# Patient Record
Sex: FEMALE | Race: OTHER | Employment: FULL TIME | URBAN - METROPOLITAN AREA
[De-identification: names, ages, dates, MRNs, and addresses within clinical notes are randomized per-mention and may not be internally consistent; named-entity substitution may affect disease eponyms.]

---

## 2021-10-05 LAB
ANTIBODY SCREEN OB: NEGATIVE
HEPATITIS B SURFACE ANTIGEN OB: NEGATIVE
HIV RESULT OB: NEGATIVE
RAPID PLASMA REAGIN OB: NONREACTIVE
RH FACTOR OB: POSITIVE

## 2021-11-30 DIAGNOSIS — Z34.02 ENCOUNTER FOR SUPERVISION OF NORMAL FIRST PREGNANCY IN SECOND TRIMESTER: Primary | ICD-10-CM

## 2021-11-30 DIAGNOSIS — Z36.89 ENCOUNTER FOR FETAL ANATOMIC SURVEY: ICD-10-CM

## 2021-12-07 DIAGNOSIS — Z34.02 ENCOUNTER FOR SUPERVISION OF NORMAL FIRST PREGNANCY IN SECOND TRIMESTER: Primary | ICD-10-CM

## 2021-12-07 DIAGNOSIS — Z36.89 ENCOUNTER FOR FETAL ANATOMIC SURVEY: ICD-10-CM

## 2022-01-03 ENCOUNTER — HOSPITAL ENCOUNTER (OUTPATIENT)
Dept: ULTRASOUND IMAGING | Facility: HOSPITAL | Age: 27
Discharge: HOME | End: 2022-01-03
Attending: OBSTETRICS & GYNECOLOGY
Payer: COMMERCIAL

## 2022-01-03 DIAGNOSIS — Z34.02 ENCOUNTER FOR SUPERVISION OF NORMAL FIRST PREGNANCY IN SECOND TRIMESTER: ICD-10-CM

## 2022-01-03 DIAGNOSIS — Z36.89 ENCOUNTER FOR FETAL ANATOMIC SURVEY: ICD-10-CM

## 2022-01-03 PROCEDURE — 76805 OB US >/= 14 WKS SNGL FETUS: CPT | Performed by: OBSTETRICS & GYNECOLOGY

## 2022-03-21 LAB — HIV RESULT OB: NEGATIVE

## 2022-04-28 LAB — STREP GP B CULT OB: NEGATIVE

## 2022-05-01 LAB — AMB EXT COVID-19 RESULT: NOT DETECTED

## 2022-05-02 ENCOUNTER — ANESTHESIA (OUTPATIENT)
Dept: OBGYN UNIT | Facility: HOSPITAL | Age: 27
End: 2022-05-02
Payer: COMMERCIAL

## 2022-05-02 ENCOUNTER — ANESTHESIA EVENT (OUTPATIENT)
Dept: OBGYN UNIT | Facility: HOSPITAL | Age: 27
End: 2022-05-02
Payer: COMMERCIAL

## 2022-05-02 ENCOUNTER — HOSPITAL ENCOUNTER (INPATIENT)
Facility: HOSPITAL | Age: 27
LOS: 2 days | Discharge: HOME | End: 2022-05-04
Attending: OBSTETRICS & GYNECOLOGY | Admitting: OBSTETRICS & GYNECOLOGY
Payer: COMMERCIAL

## 2022-05-02 PROBLEM — Z34.90 PREGNANCY (HCC): Status: ACTIVE | Noted: 2022-05-02

## 2022-05-02 PROBLEM — Z34.90 PREGNANCY: Status: ACTIVE | Noted: 2022-05-02

## 2022-05-02 LAB
ALBUMIN SERPL-MCNC: 2.5 G/DL (ref 3.4–5)
ALBUMIN/GLOB SERPL: 0.5 {RATIO} (ref 1–2)
ALP LIVER SERPL-CCNC: 163 U/L
ALT SERPL-CCNC: 47 U/L
ANION GAP SERPL CALC-SCNC: 6 MMOL/L (ref 0–18)
ANTIBODY SCREEN: NEGATIVE
AST SERPL-CCNC: 45 U/L (ref 15–37)
BASOPHILS # BLD AUTO: 0.03 X10(3) UL (ref 0–0.2)
BASOPHILS NFR BLD AUTO: 0.3 %
BILIRUB SERPL-MCNC: 0.5 MG/DL (ref 0.1–2)
BUN BLD-MCNC: 6 MG/DL (ref 7–18)
CALCIUM BLD-MCNC: 9.1 MG/DL (ref 8.5–10.1)
CHLORIDE SERPL-SCNC: 109 MMOL/L (ref 98–112)
CO2 SERPL-SCNC: 22 MMOL/L (ref 21–32)
CREAT BLD-MCNC: 0.6 MG/DL
EOSINOPHIL # BLD AUTO: 0.04 X10(3) UL (ref 0–0.7)
EOSINOPHIL NFR BLD AUTO: 0.4 %
ERYTHROCYTE [DISTWIDTH] IN BLOOD BY AUTOMATED COUNT: 21.6 %
FASTING STATUS PATIENT QL REPORTED: YES
GLOBULIN PLAS-MCNC: 4.7 G/DL (ref 2.8–4.4)
GLUCOSE BLD-MCNC: 89 MG/DL (ref 70–99)
HCT VFR BLD AUTO: 38.5 %
HGB BLD-MCNC: 11.9 G/DL
IMM GRANULOCYTES # BLD AUTO: 0.08 X10(3) UL (ref 0–1)
IMM GRANULOCYTES NFR BLD: 0.8 %
LYMPHOCYTES # BLD AUTO: 2.01 X10(3) UL (ref 1–4)
LYMPHOCYTES NFR BLD AUTO: 21 %
MCH RBC QN AUTO: 24.8 PG (ref 26–34)
MCHC RBC AUTO-ENTMCNC: 30.9 G/DL (ref 31–37)
MCV RBC AUTO: 80.4 FL
MONOCYTES # BLD AUTO: 0.63 X10(3) UL (ref 0.1–1)
MONOCYTES NFR BLD AUTO: 6.6 %
NEUTROPHILS # BLD AUTO: 6.79 X10 (3) UL (ref 1.5–7.7)
NEUTROPHILS # BLD AUTO: 6.79 X10(3) UL (ref 1.5–7.7)
NEUTROPHILS NFR BLD AUTO: 70.9 %
OSMOLALITY SERPL CALC.SUM OF ELEC: 281 MOSM/KG (ref 275–295)
PLATELET # BLD AUTO: 285 10(3)UL (ref 150–450)
POTASSIUM SERPL-SCNC: 4.1 MMOL/L (ref 3.5–5.1)
PROT SERPL-MCNC: 7.2 G/DL (ref 6.4–8.2)
RBC # BLD AUTO: 4.79 X10(6)UL
RH BLOOD TYPE: POSITIVE
SODIUM SERPL-SCNC: 137 MMOL/L (ref 136–145)
T PALLIDUM AB SER QL IA: NONREACTIVE
URATE SERPL-MCNC: 3.9 MG/DL
WBC # BLD AUTO: 9.6 X10(3) UL (ref 4–11)

## 2022-05-02 PROCEDURE — 99214 OFFICE O/P EST MOD 30 MIN: CPT

## 2022-05-02 PROCEDURE — 0UQMXZZ REPAIR VULVA, EXTERNAL APPROACH: ICD-10-PCS | Performed by: OBSTETRICS & GYNECOLOGY

## 2022-05-02 PROCEDURE — 85025 COMPLETE CBC W/AUTO DIFF WBC: CPT | Performed by: OBSTETRICS & GYNECOLOGY

## 2022-05-02 PROCEDURE — 86900 BLOOD TYPING SEROLOGIC ABO: CPT | Performed by: OBSTETRICS & GYNECOLOGY

## 2022-05-02 PROCEDURE — 86850 RBC ANTIBODY SCREEN: CPT | Performed by: OBSTETRICS & GYNECOLOGY

## 2022-05-02 PROCEDURE — 84550 ASSAY OF BLOOD/URIC ACID: CPT | Performed by: OBSTETRICS & GYNECOLOGY

## 2022-05-02 PROCEDURE — 86780 TREPONEMA PALLIDUM: CPT | Performed by: OBSTETRICS & GYNECOLOGY

## 2022-05-02 PROCEDURE — 80053 COMPREHEN METABOLIC PANEL: CPT | Performed by: OBSTETRICS & GYNECOLOGY

## 2022-05-02 PROCEDURE — 86901 BLOOD TYPING SEROLOGIC RH(D): CPT | Performed by: OBSTETRICS & GYNECOLOGY

## 2022-05-02 RX ORDER — BUPIVACAINE HCL/0.9 % NACL/PF 0.25 %
5 PLASTIC BAG, INJECTION (ML) EPIDURAL AS NEEDED
Status: DISCONTINUED | OUTPATIENT
Start: 2022-05-02 | End: 2022-05-02

## 2022-05-02 RX ORDER — IBUPROFEN 600 MG/1
600 TABLET ORAL EVERY 6 HOURS PRN
Status: DISCONTINUED | OUTPATIENT
Start: 2022-05-02 | End: 2022-05-02 | Stop reason: HOSPADM

## 2022-05-02 RX ORDER — METHYLERGONOVINE MALEATE 0.2 MG/ML
INJECTION INTRAVENOUS
Status: COMPLETED
Start: 2022-05-02 | End: 2022-05-02

## 2022-05-02 RX ORDER — SIMETHICONE 80 MG
80 TABLET,CHEWABLE ORAL 3 TIMES DAILY PRN
Status: DISCONTINUED | OUTPATIENT
Start: 2022-05-02 | End: 2022-05-04

## 2022-05-02 RX ORDER — TRISODIUM CITRATE DIHYDRATE AND CITRIC ACID MONOHYDRATE 500; 334 MG/5ML; MG/5ML
30 SOLUTION ORAL AS NEEDED
Status: DISCONTINUED | OUTPATIENT
Start: 2022-05-02 | End: 2022-05-02 | Stop reason: HOSPADM

## 2022-05-02 RX ORDER — IBUPROFEN 600 MG/1
600 TABLET ORAL EVERY 6 HOURS
Status: DISCONTINUED | OUTPATIENT
Start: 2022-05-02 | End: 2022-05-04

## 2022-05-02 RX ORDER — BISACODYL 10 MG
10 SUPPOSITORY, RECTAL RECTAL ONCE AS NEEDED
Status: DISCONTINUED | OUTPATIENT
Start: 2022-05-02 | End: 2022-05-04

## 2022-05-02 RX ORDER — METHYLERGONOVINE MALEATE 0.2 MG/ML
0.2 INJECTION INTRAVENOUS ONCE
Status: COMPLETED | OUTPATIENT
Start: 2022-05-02 | End: 2022-05-02

## 2022-05-02 RX ORDER — ACETAMINOPHEN 500 MG
1000 TABLET ORAL EVERY 6 HOURS PRN
Status: DISCONTINUED | OUTPATIENT
Start: 2022-05-02 | End: 2022-05-04

## 2022-05-02 RX ORDER — ONDANSETRON 2 MG/ML
4 INJECTION INTRAMUSCULAR; INTRAVENOUS EVERY 6 HOURS PRN
Status: DISCONTINUED | OUTPATIENT
Start: 2022-05-02 | End: 2022-05-02 | Stop reason: HOSPADM

## 2022-05-02 RX ORDER — DOCUSATE SODIUM 100 MG/1
100 CAPSULE, LIQUID FILLED ORAL
Status: DISCONTINUED | OUTPATIENT
Start: 2022-05-02 | End: 2022-05-04

## 2022-05-02 RX ORDER — ACETAMINOPHEN 500 MG
500 TABLET ORAL EVERY 6 HOURS PRN
Status: DISCONTINUED | OUTPATIENT
Start: 2022-05-02 | End: 2022-05-04

## 2022-05-02 RX ORDER — AMMONIA INHALANTS 0.04 G/.3ML
0.3 INHALANT RESPIRATORY (INHALATION) AS NEEDED
Status: DISCONTINUED | OUTPATIENT
Start: 2022-05-02 | End: 2022-05-02 | Stop reason: HOSPADM

## 2022-05-02 RX ORDER — GLYCERIN/MINERAL OIL
LOTION (ML) TOPICAL
COMMUNITY

## 2022-05-02 RX ORDER — ACETAMINOPHEN 500 MG
500 TABLET ORAL EVERY 6 HOURS PRN
Status: DISCONTINUED | OUTPATIENT
Start: 2022-05-02 | End: 2022-05-02

## 2022-05-02 RX ORDER — MELATONIN
325
COMMUNITY

## 2022-05-02 RX ORDER — DEXTROSE, SODIUM CHLORIDE, SODIUM LACTATE, POTASSIUM CHLORIDE, AND CALCIUM CHLORIDE 5; .6; .31; .03; .02 G/100ML; G/100ML; G/100ML; G/100ML; G/100ML
INJECTION, SOLUTION INTRAVENOUS AS NEEDED
Status: DISCONTINUED | OUTPATIENT
Start: 2022-05-02 | End: 2022-05-02 | Stop reason: HOSPADM

## 2022-05-02 RX ORDER — SODIUM CHLORIDE, SODIUM LACTATE, POTASSIUM CHLORIDE, CALCIUM CHLORIDE 600; 310; 30; 20 MG/100ML; MG/100ML; MG/100ML; MG/100ML
INJECTION, SOLUTION INTRAVENOUS CONTINUOUS
Status: DISCONTINUED | OUTPATIENT
Start: 2022-05-02 | End: 2022-05-02 | Stop reason: HOSPADM

## 2022-05-02 RX ORDER — TERBUTALINE SULFATE 1 MG/ML
0.25 INJECTION, SOLUTION SUBCUTANEOUS AS NEEDED
Status: DISCONTINUED | OUTPATIENT
Start: 2022-05-02 | End: 2022-05-02 | Stop reason: HOSPADM

## 2022-05-02 RX ORDER — NALBUPHINE HCL 10 MG/ML
2.5 AMPUL (ML) INJECTION
Status: DISCONTINUED | OUTPATIENT
Start: 2022-05-02 | End: 2022-05-02

## 2022-05-02 NOTE — PROGRESS NOTES
NURSING ADMISSION NOTE      Patient admitted via wheelchair  Oriented to room. Safety precautions initiated. Bed in low position. Call light in reach. Instructed to call for assistance before ambulating for the 1st time. RN reviewed admit packet with patient and  Plan of care reviewed.

## 2022-05-02 NOTE — PROGRESS NOTES
Pt ambulatory to triage to r/o srom. Pt is  stating leakage of clear fluid at 0800 today and mild contractions every3-5 minutes apart.  Triage process explained and pt verbalized understanding

## 2022-05-02 NOTE — PLAN OF CARE
Problem: BIRTH - VAGINAL/ SECTION  Goal: Fetal and maternal status remain reassuring during the birth process  Description: INTERVENTIONS:  - Monitor vital signs  - Monitor fetal heart rate  - Monitor uterine activity  - Monitor labor progression (vaginal delivery)  - DVT prophylaxis (C/S delivery)  - Surgical antibiotic prophylaxis (C/S delivery)  Outcome: Progressing     Problem: PAIN - ADULT  Goal: Verbalizes/displays adequate comfort level or patient's stated pain goal  Description: INTERVENTIONS:  - Encourage pt to monitor pain and request assistance  - Assess pain using appropriate pain scale  - Administer analgesics based on type and severity of pain and evaluate response  - Implement non-pharmacological measures as appropriate and evaluate response  - Consider cultural and social influences on pain and pain management  - Manage/alleviate anxiety  - Utilize distraction and/or relaxation techniques  - Monitor for opioid side effects  - Notify MD/LIP if interventions unsuccessful or patient reports new pain  - Anticipate increased pain with activity and pre-medicate as appropriate  Outcome: Progressing     Problem: ANXIETY  Goal: Will report anxiety at manageable levels  Description: INTERVENTIONS:  - Administer medication as ordered  - Teach and rehearse alternative coping skills  - Provide emotional support with 1:1 interaction with staff  Outcome: Progressing     Problem: Patient/Family Goals  Goal: Patient/Family Long Term Goal  Description: Patient's Long Term Goal: Have a safe delivery    Interventions:  - Follow policies and procedures.  Keep provider informed of pt status  - See additional Care Plan goals for specific interventions  Outcome: Progressing  Goal: Patient/Family Short Term Goal  Description: Patient's Short Term Goal: Adequate pain management    Interventions:   - Offer pain meds and epidural  - See additional Care Plan goals for specific interventions  Outcome: Progressing Problem: SAFETY ADULT - FALL  Goal: Free from fall injury  Description: INTERVENTIONS:  - Assess pt frequently for physical needs  - Identify cognitive and physical deficits and behaviors that affect risk of falls.   - [place] fall precautions as indicated by assessment.  - Educate pt/family on patient safety including physical limitations  - Instruct pt to call for assistance with activity based on assessment  - Modify environment to reduce risk of injury  - Provide assistive devices as appropriate  - Consider OT/PT consult to assist with strengthening/mobility  - Encourage toileting schedule  Outcome: Progressing

## 2022-05-02 NOTE — ANESTHESIA PROCEDURE NOTES
Labor Analgesia  Performed by: Zaire Galeano MD  Authorized by: Zaire Galeano MD       General Information and Staff    Start Time:  5/2/2022 10:38 AM  End Time:  5/2/2022 10:40 AM  Anesthesiologist:  Zaire Galeano MD  Performed by:   Anesthesiologist  Patient Location:  OB  Site Identification: surface landmarks    Reason for Block: labor epidural    Preanesthetic Checklist: patient identified, IV checked, risks and benefits discussed, monitors and equipment checked, pre-op evaluation, timeout performed, IV bolus, anesthesia consent and sterile technique used      Procedure Details    Patient Position:  Sitting  Prep: ChloraPrep    Monitoring:  Heart rate and continuous pulse ox  Approach:  Midline    Epidural Needle    Injection Technique:  VIGNESH air  Needle Type:  Tuohy  Needle Gauge:  17 G  Needle Length:  3.375 in  Needle Insertion Depth:  5    Spinal Needle      Catheter    Catheter Type:  End hole  Catheter Size:  19 G  Catheter at Skin Depth:  10  Test Dose:  Negative    Assessment  Sensory Level:  T8    Additional Comments     Initial 3mL test dose + 100ug fentanyl + remaining 2mL test dose administered via epidural catheter

## 2022-05-02 NOTE — PLAN OF CARE
Problem: SAFETY ADULT - FALL  Goal: Free from fall injury  Description: INTERVENTIONS:  - Assess pt frequently for physical needs  - Identify cognitive and physical deficits and behaviors that affect risk of falls. - [place] fall precautions as indicated by assessment.  - Educate pt/family on patient safety including physical limitations  - Instruct pt to call for assistance with activity based on assessment  - Modify environment to reduce risk of injury  - Provide assistive devices as appropriate  - Consider OT/PT consult to assist with strengthening/mobility  - Encourage toileting schedule  Outcome: Progressing     Problem: POSTPARTUM  Goal: Long Term Goal:Experiences normal postpartum course  Description: INTERVENTIONS:  - Assess and monitor vital signs and lab values. - Assess fundus and lochia. - Provide ice/sitz baths for perineum discomfort. - Monitor healing of incision/episiotomy/laceration, and assess for signs and symptoms of infection and hematoma. - Assess bladder function and monitor for bladder distention.  - Provide/instruct/assist with pericare as needed. - Provide VTE prophylaxis as needed. - Monitor bowel function.  - Encourage ambulation and provide assistance as needed. - Assess and monitor emotional status and provide social service/psych resources as needed. - Utilize standard precautions and use personal protective equipment as indicated. Ensure aseptic care of all intravenous lines and invasive tubes/drains.  - Obtain immunization and exposure to communicable diseases history. Outcome: Progressing  Goal: Optimize infant feeding at the breast  Description: INTERVENTIONS:  - Initiate breast feeding within first hour after birth. - Monitor effectiveness of current breast feeding efforts. - Assess support systems available to mother/family.  - Identify cultural beliefs/practices regarding lactation, letdown techniques, maternal food preferences.   - Assess mother's knowledge and previous experience with breast feeding.  - Provide information as needed about early infant feeding cues (e.g., rooting, lip smacking, sucking fingers/hand) versus late cue of crying.  - Discuss/demonstrate breast feeding aids (e.g., infant sling, nursing footstool/pillows, and breast pumps). - Encourage mother/other family members to express feelings/concerns, and actively listen. - Educate father/SO about benefits of breast feeding and how to manage common lactation challenges. - Recommend avoidance of specific medications or substances incompatible with breast feeding.  - Assess and monitor for signs of nipple pain/trauma. - Instruct and provide assistance with proper latch. - Review techniques for milk expression (breast pumping) and storage of breast milk. Provide pumping equipment/supplies, instructions and assistance, as needed. - Encourage rooming-in and breast feeding on demand.  - Encourage skin-to-skin contact. - Provide LC support as needed. - Assess for and manage engorgement. - Provide breast feeding education handouts and information on community breast feeding support. Outcome: Progressing  Goal: Establishment of adequate milk supply with medication/procedure interruptions  Description: INTERVENTIONS:  - Review techniques for milk expression (breast pumping). - Provide pumping equipment/supplies, instructions, and assistance until it is safe to breastfeed infant. Outcome: Progressing  Goal: Experiences normal breast weaning course  Description: INTERVENTIONS:  - Assess for and manage engorgement. - Instruct on breast care. - Provide comfort measures. Outcome: Progressing  Goal: Appropriate maternal -  bonding  Description: INTERVENTIONS:  - Assess caregiver- interactions. - Assess caregiver's emotional status and coping mechanisms. - Encourage caregiver to participate in  daily care.   - Assess support systems available to mother/family.  - Provide social services/case management support as needed.   Outcome: Progressing

## 2022-05-02 NOTE — L&D DELIVERY NOTE
Alannah Bunch, Girl [[id number]]    Labor Events     labor?: No   steroids?: None  Antibiotics received during labor?: No  Antibiotics (enter # doses in comment): none  Rupture date/time: 2022 0800     Rupture type: SROM  Fluid color: Clear  Induction: None  Intrapartum & labor complications: None     Labor Event Times    Labor onset date/time: 2022 0800  Dilation complete date/time: 2022 0940  Start pushing date/time: 2022 1100      Presentation    Presentation: Vertex  Position: Right Occiput Anterior     Operative Delivery    Operative Vaginal Delivery: No            Shoulder Dystocia    Shoulder Dystocia: No     Anesthesia    Method: Epidural           Delivery    Head delivery date/time: 2022 11:21:28   Delivery date/time:  22 11:21:43   Delivery type: Normal spontaneous vaginal delivery    Details:     Delivery location: delivery room     Delivery Providers    Delivering Clinician: Kristina Johnson MD   Delivery personnel:  Provider Role   Phillip White RN Baby Nurse   Juan Berrios RN Delivery Nurse         Cord    No data filed     [place] Measurements    Weight: 3240 g 7 lb 2.3 oz Length: 49.5 cm         Placenta    No data filed     Apgars    No data filed     Skin to Skin    No data filed     Vaginal Count    Initial count RN: Lubna Beaulieu RN  Initial count Tech: Ban Maynard   [place]    Initial counts 11   0    Final counts        Final count MD: Kristina Johnson MD     Delivery (Maternal)    Episiotomy: None  Periurethral laceration: right                                                                    Vaginal Delivery Note          [facility] [facility] Patient Status:  Inpatient    1995 MRN [id number]   Location [address] [place] [address] Attending Kristina Johnson MD   Hosp Day # 0 PCP PHYSICIAN NONSTAFF       Delivery: Normal spontaneous vaginal delivery  Surgeon: Flora Tello  Anesthesia: Epidural  EBL: 200cc  Infant: female  Apgars: 9, 9  Weight: 7# 2oz  Procedure: The patients was placed in the dorsalithotomy position and prepped. She was encouraged to push. As the head was delivered the legs were lowered and the perineum was supported to decrease the risk of tearing. The infants nose and mouth were bulb suctioned. The shoulders and body were delivered atraumatically. The infant was dried and suctioned. The cord was doubly clamped and cut at [address]. The cord blood was sampled. IV pitocin and gentle uterine massage helped delivery of the placenta. A small periurethral laceration repaired with 4-0 vicryl. A rectal-vaginal exam was normal and bleeding was minimal.  The patient was then moved to the supine position in stable condition. Counts were correct.     Nuchal cord: no  Cord Blood Banking: no  Episiotomy: no  Complications:  None      Mojgan Fink MD  5/2/2022  11:34 AM

## 2022-05-02 NOTE — PROGRESS NOTES
Pt up to BR with assistance from this RN. Gait steady. Pt unable to void at this time. Elena-bottle given. Pt up to [facility] without incident.

## 2022-05-02 NOTE — PLAN OF CARE
Problem: BIRTH - VAGINAL/ SECTION  Goal: Fetal and maternal status remain reassuring during the birth process  Description: INTERVENTIONS:  - Monitor vital signs  - Monitor fetal heart rate  - Monitor uterine activity  - Monitor labor progression (vaginal delivery)  - DVT prophylaxis (C/S delivery)  - Surgical antibiotic prophylaxis (C/S delivery)  2022 by Hector Castle RN  Outcome: Completed  2022 by Hector Castle RN  Outcome: Progressing     Problem: PAIN - ADULT  Goal: Verbalizes/displays adequate comfort level or patient's stated pain goal  Description: INTERVENTIONS:  - Encourage pt to monitor pain and request assistance  - Assess pain using appropriate pain scale  - Administer analgesics based on type and severity of pain and evaluate response  - Implement non-pharmacological measures as appropriate and evaluate response  - Consider cultural and social influences on pain and pain management  - Manage/alleviate anxiety  - Utilize distraction and/or relaxation techniques  - Monitor for opioid side effects  - Notify MD/LIP if interventions unsuccessful or patient reports new pain  - Anticipate increased pain with activity and pre-medicate as appropriate  2022 by Hector Castle RN  Outcome: Completed  2022 by Hector Castle RN  Outcome: Progressing     Problem: ANXIETY  Goal: Will report anxiety at manageable levels  Description: INTERVENTIONS:  - Administer medication as ordered  - Teach and rehearse alternative coping skills  - Provide emotional support with 1:1 interaction with staff  2022 by Hector Castle RN  Outcome: Completed  2022 by Hector Castle RN  Outcome: Progressing     Problem: Patient/Family Goals  Goal: Patient/Family Long Term Goal  Description: Patient's Long Term Goal: Have a safe delivery    Interventions:  - Follow policies and procedures.  Keep provider informed of pt status  - See additional Care Plan goals for specific interventions  5/2/2022 1226 by Coby Lechuga RN  Outcome: Completed  5/2/2022 1226 by Coby Lechuga RN  Outcome: Progressing  Goal: Patient/Family Short Term Goal  Description: Patient's Short Term Goal: Adequate pain management    Interventions:   - Offer pain meds and epidural  - See additional Care Plan goals for specific interventions  5/2/2022 1226 by Coby Lechuga RN  Outcome: Completed  5/2/2022 1226 by Coby Lechuga RN  Outcome: Progressing     Problem: SAFETY ADULT - FALL  Goal: Free from fall injury  Description: INTERVENTIONS:  - Assess pt frequently for physical needs  - Identify cognitive and physical deficits and behaviors that affect risk of falls.   - [place] fall precautions as indicated by assessment.  - Educate pt/family on patient safety including physical limitations  - Instruct pt to call for assistance with activity based on assessment  - Modify environment to reduce risk of injury  - Provide assistive devices as appropriate  - Consider OT/PT consult to assist with strengthening/mobility  - Encourage toileting schedule  5/2/2022 1226 by Coby Lechuga RN  Outcome: Progressing  5/2/2022 1226 by Coby Lechuga RN  Outcome: Progressing

## 2022-05-03 LAB
BASOPHILS # BLD AUTO: 0.03 X10(3) UL (ref 0–0.2)
BASOPHILS NFR BLD AUTO: 0.2 %
EOSINOPHIL # BLD AUTO: 0.08 X10(3) UL (ref 0–0.7)
EOSINOPHIL NFR BLD AUTO: 0.6 %
ERYTHROCYTE [DISTWIDTH] IN BLOOD BY AUTOMATED COUNT: 21.2 %
HCT VFR BLD AUTO: 30.7 %
HGB BLD-MCNC: 9.4 G/DL
IMM GRANULOCYTES # BLD AUTO: 0.15 X10(3) UL (ref 0–1)
IMM GRANULOCYTES NFR BLD: 1.2 %
LYMPHOCYTES # BLD AUTO: 2.58 X10(3) UL (ref 1–4)
LYMPHOCYTES NFR BLD AUTO: 20.9 %
MCH RBC QN AUTO: 24.8 PG (ref 26–34)
MCHC RBC AUTO-ENTMCNC: 30.6 G/DL (ref 31–37)
MCV RBC AUTO: 81 FL
MONOCYTES # BLD AUTO: 0.78 X10(3) UL (ref 0.1–1)
MONOCYTES NFR BLD AUTO: 6.3 %
NEUTROPHILS # BLD AUTO: 8.7 X10 (3) UL (ref 1.5–7.7)
NEUTROPHILS # BLD AUTO: 8.7 X10(3) UL (ref 1.5–7.7)
NEUTROPHILS NFR BLD AUTO: 70.8 %
PLATELET # BLD AUTO: 253 10(3)UL (ref 150–450)
RBC # BLD AUTO: 3.79 X10(6)UL
WBC # BLD AUTO: 12.3 X10(3) UL (ref 4–11)

## 2022-05-03 PROCEDURE — 85025 COMPLETE CBC W/AUTO DIFF WBC: CPT | Performed by: OBSTETRICS & GYNECOLOGY

## 2022-05-03 NOTE — PLAN OF CARE
Problem: SAFETY ADULT - FALL  Goal: Free from fall injury  Description: INTERVENTIONS:  - Assess pt frequently for physical needs  - Identify cognitive and physical deficits and behaviors that affect risk of falls. - [place] fall precautions as indicated by assessment.  - Educate pt/family on patient safety including physical limitations  - Instruct pt to call for assistance with activity based on assessment  - Modify environment to reduce risk of injury  - Provide assistive devices as appropriate  - Consider OT/PT consult to assist with strengthening/mobility  - Encourage toileting schedule  Outcome: Progressing     Problem: POSTPARTUM  Goal: Long Term Goal:Experiences normal postpartum course  Description: INTERVENTIONS:  - Assess and monitor vital signs and lab values. - Assess fundus and lochia. - Provide ice/sitz baths for perineum discomfort. - Monitor healing of incision/episiotomy/laceration, and assess for signs and symptoms of infection and hematoma. - Assess bladder function and monitor for bladder distention.  - Provide/instruct/assist with pericare as needed. - Provide VTE prophylaxis as needed. - Monitor bowel function.  - Encourage ambulation and provide assistance as needed. - Assess and monitor emotional status and provide social service/psych resources as needed. - Utilize standard precautions and use personal protective equipment as indicated. Ensure aseptic care of all intravenous lines and invasive tubes/drains.  - Obtain immunization and exposure to communicable diseases history. Outcome: Progressing  Goal: Optimize infant feeding at the breast  Description: INTERVENTIONS:  - Initiate breast feeding within first hour after birth. - Monitor effectiveness of current breast feeding efforts. - Assess support systems available to mother/family.  - Identify cultural beliefs/practices regarding lactation, letdown techniques, maternal food preferences.   - Assess mother's knowledge and previous experience with breast feeding.  - Provide information as needed about early infant feeding cues (e.g., rooting, lip smacking, sucking fingers/hand) versus late cue of crying.  - Discuss/demonstrate breast feeding aids (e.g., infant sling, nursing footstool/pillows, and breast pumps). - Encourage mother/other family members to express feelings/concerns, and actively listen. - Educate father/SO about benefits of breast feeding and how to manage common lactation challenges. - Recommend avoidance of specific medications or substances incompatible with breast feeding.  - Assess and monitor for signs of nipple pain/trauma. - Instruct and provide assistance with proper latch. - Review techniques for milk expression (breast pumping) and storage of breast milk. Provide pumping equipment/supplies, instructions and assistance, as needed. - Encourage rooming-in and breast feeding on demand.  - Encourage skin-to-skin contact. - Provide LC support as needed. - Assess for and manage engorgement. - Provide breast feeding education handouts and information on community breast feeding support. Outcome: Progressing  Goal: Appropriate maternal -  bonding  Description: INTERVENTIONS:  - Assess caregiver- interactions. - Assess caregiver's emotional status and coping mechanisms. - Encourage caregiver to participate in  daily care. - Assess support systems available to mother/family.  - Provide /case management support as needed.   Outcome: Progressing

## 2022-05-04 VITALS
RESPIRATION RATE: 16 BRPM | HEIGHT: 67 IN | SYSTOLIC BLOOD PRESSURE: 119 MMHG | TEMPERATURE: 98 F | OXYGEN SATURATION: 82 % | BODY MASS INDEX: 30.45 KG/M2 | HEART RATE: 69 BPM | WEIGHT: 194 LBS | DIASTOLIC BLOOD PRESSURE: 82 MMHG

## 2022-05-04 NOTE — DISCHARGE SUMMARY
[facility]  Discharge Summary    [address] [facility] Patient Status:  Inpatient    1995 MRN [id number]   [facility] 2SW-J Attending Yazmin Leon MD   Hosp Day # 2 PCP PHYSICIAN NONSTAFF     Primary OB Clinician: Katarina Abbott    [facility]: Estimated Date of Delivery: 22    Gestational Age: 37w4d    Antepartum complications: anemia    Date of Delivery: 22    Time of Delivery: am    Delivered By: Dc Smith MD    Delivery Type: spontaneous vaginal delivery    Baby: Liveborn female     Anesthesia: epidural    Intrapartum Complications: None    Laceration: none    Episiotomy: none    Placenta: spontaneous    Feeding Method: breast fed    Rh Immune Globulin Given: no    Rubella Vaccine Given: no    Discharge Medications: PNV    Discharge Date: 22    Discharge Time: AM    Plan:     Address and phone number verified and same. Follow-up appointment with Katarina Abbott  in 6 weeks.     Dc Smith MD  2022  8:53 AM

## 2022-05-04 NOTE — PROGRESS NOTES
All discharge teaching reviewed and questions answered. Hugs and kisses discharged from the system. Baby secure in car seat. Family escorted out by  [facility]. Bradely Barnhart

## 2022-05-06 ENCOUNTER — TELEPHONE (OUTPATIENT)
Dept: OBGYN UNIT | Facility: HOSPITAL | Age: 27
End: 2022-05-06

## 2022-11-02 ENCOUNTER — HOSPITAL ENCOUNTER (EMERGENCY)
Facility: HOSPITAL | Age: 27
Discharge: HOME | End: 2022-11-02
Attending: EMERGENCY MEDICINE
Payer: COMMERCIAL

## 2022-11-02 VITALS
BODY MASS INDEX: 26.68 KG/M2 | WEIGHT: 170 LBS | HEIGHT: 67 IN | DIASTOLIC BLOOD PRESSURE: 80 MMHG | OXYGEN SATURATION: 98 % | TEMPERATURE: 98 F | SYSTOLIC BLOOD PRESSURE: 132 MMHG | HEART RATE: 84 BPM | RESPIRATION RATE: 18 BRPM

## 2022-11-02 DIAGNOSIS — B00.1 COLD SORE: Primary | ICD-10-CM

## 2022-11-02 DIAGNOSIS — R51.9 ACUTE NONINTRACTABLE HEADACHE, UNSPECIFIED HEADACHE TYPE: ICD-10-CM

## 2022-11-02 PROCEDURE — 99283 EMERGENCY DEPT VISIT LOW MDM: CPT

## 2022-11-02 RX ORDER — HYDROCODONE BITARTRATE AND ACETAMINOPHEN 5; 325 MG/1; MG/1
1 TABLET ORAL EVERY 6 HOURS PRN
Qty: 12 TABLET | Refills: 0 | Status: SHIPPED | OUTPATIENT
Start: 2022-11-02 | End: 2022-11-09

## 2022-11-02 RX ORDER — HYDROCODONE BITARTRATE AND ACETAMINOPHEN 5; 325 MG/1; MG/1
1 TABLET ORAL ONCE
Status: COMPLETED | OUTPATIENT
Start: 2022-11-02 | End: 2022-11-02

## 2022-11-02 RX ORDER — IBUPROFEN 600 MG/1
600 TABLET ORAL EVERY 8 HOURS PRN
Qty: 30 TABLET | Refills: 0 | Status: SHIPPED | OUTPATIENT
Start: 2022-11-02 | End: 2022-11-09

## 2022-11-02 RX ORDER — IBUPROFEN 600 MG/1
600 TABLET ORAL ONCE
Status: COMPLETED | OUTPATIENT
Start: 2022-11-02 | End: 2022-11-02

## 2022-11-03 NOTE — ED INITIAL ASSESSMENT (HPI)
S: Patient has presumed cold sore to lip, on valacyclovir since yesterday but has a headache on the affected side and jaw pain that she states is unbearable. B: No hx.

## 2023-09-23 ENCOUNTER — APPOINTMENT (OUTPATIENT)
Dept: GENERAL RADIOLOGY | Facility: HOSPITAL | Age: 28
End: 2023-09-23
Attending: STUDENT IN AN ORGANIZED HEALTH CARE EDUCATION/TRAINING PROGRAM
Payer: COMMERCIAL

## 2023-09-23 ENCOUNTER — HOSPITAL ENCOUNTER (EMERGENCY)
Facility: HOSPITAL | Age: 28
Discharge: HOME | End: 2023-09-23
Attending: STUDENT IN AN ORGANIZED HEALTH CARE EDUCATION/TRAINING PROGRAM
Payer: COMMERCIAL

## 2023-09-23 VITALS
OXYGEN SATURATION: 97 % | RESPIRATION RATE: 18 BRPM | SYSTOLIC BLOOD PRESSURE: 128 MMHG | DIASTOLIC BLOOD PRESSURE: 74 MMHG | WEIGHT: 173 LBS | BODY MASS INDEX: 27.15 KG/M2 | HEART RATE: 111 BPM | HEIGHT: 67 IN | TEMPERATURE: 98 F

## 2023-09-23 DIAGNOSIS — J02.0 STREP PHARYNGITIS: Primary | ICD-10-CM

## 2023-09-23 LAB
ALBUMIN SERPL-MCNC: 3.3 G/DL (ref 3.4–5)
ALBUMIN/GLOB SERPL: 0.6 {RATIO} (ref 1–2)
ALP LIVER SERPL-CCNC: 177 U/L
ALT SERPL-CCNC: 215 U/L
ANION GAP SERPL CALC-SCNC: 5 MMOL/L (ref 0–18)
AST SERPL-CCNC: 119 U/L (ref 15–37)
BASOPHILS # BLD AUTO: 0.02 X10(3) UL (ref 0–0.2)
BASOPHILS NFR BLD AUTO: 0.2 %
BILIRUB SERPL-MCNC: 0.4 MG/DL (ref 0.1–2)
BUN BLD-MCNC: 10 MG/DL (ref 7–18)
BUN/CREAT SERPL: 17.2 (ref 10–20)
CALCIUM BLD-MCNC: 9.2 MG/DL (ref 8.5–10.1)
CHLORIDE SERPL-SCNC: 107 MMOL/L (ref 98–112)
CO2 SERPL-SCNC: 27 MMOL/L (ref 21–32)
CREAT BLD-MCNC: 0.58 MG/DL
DEPRECATED RDW RBC AUTO: 42.7 FL (ref 35.1–46.3)
EGFRCR SERPLBLD CKD-EPI 2021: 126 ML/MIN/1.73M2 (ref 60–?)
EOSINOPHIL # BLD AUTO: 0.04 X10(3) UL (ref 0–0.7)
EOSINOPHIL NFR BLD AUTO: 0.4 %
ERYTHROCYTE [DISTWIDTH] IN BLOOD BY AUTOMATED COUNT: 13.2 % (ref 11–15)
FLUAV + FLUBV RNA SPEC NAA+PROBE: NEGATIVE
FLUAV + FLUBV RNA SPEC NAA+PROBE: NEGATIVE
GLOBULIN PLAS-MCNC: 5.1 G/DL (ref 2.8–4.4)
GLUCOSE BLD-MCNC: 109 MG/DL (ref 70–99)
HCT VFR BLD AUTO: 37.9 %
HETEROPH AB SER QL: NEGATIVE
HGB BLD-MCNC: 12.4 G/DL
IMM GRANULOCYTES # BLD AUTO: 0.04 X10(3) UL (ref 0–1)
IMM GRANULOCYTES NFR BLD: 0.4 %
LYMPHOCYTES # BLD AUTO: 1.51 X10(3) UL (ref 1–4)
LYMPHOCYTES NFR BLD AUTO: 14.2 %
MCH RBC QN AUTO: 28.7 PG (ref 26–34)
MCHC RBC AUTO-ENTMCNC: 32.7 G/DL (ref 31–37)
MCV RBC AUTO: 87.7 FL
MONOCYTES # BLD AUTO: 0.82 X10(3) UL (ref 0.1–1)
MONOCYTES NFR BLD AUTO: 7.7 %
NEUTROPHILS # BLD AUTO: 8.24 X10 (3) UL (ref 1.5–7.7)
NEUTROPHILS # BLD AUTO: 8.24 X10(3) UL (ref 1.5–7.7)
NEUTROPHILS NFR BLD AUTO: 77.1 %
OSMOLALITY SERPL CALC.SUM OF ELEC: 288 MOSM/KG (ref 275–295)
PLATELET # BLD AUTO: 420 10(3)UL (ref 150–450)
POTASSIUM SERPL-SCNC: 3.7 MMOL/L (ref 3.5–5.1)
PROT SERPL-MCNC: 8.4 G/DL (ref 6.4–8.2)
RBC # BLD AUTO: 4.32 X10(6)UL
RSV RNA SPEC NAA+PROBE: NEGATIVE
S PYO AG THROAT QL: POSITIVE
SARS-COV-2 RNA RESP QL NAA+PROBE: NOT DETECTED
SODIUM SERPL-SCNC: 139 MMOL/L (ref 136–145)
WBC # BLD AUTO: 10.7 X10(3) UL (ref 4–11)

## 2023-09-23 PROCEDURE — 99285 EMERGENCY DEPT VISIT HI MDM: CPT

## 2023-09-23 PROCEDURE — 86665 EPSTEIN-BARR CAPSID VCA: CPT | Performed by: STUDENT IN AN ORGANIZED HEALTH CARE EDUCATION/TRAINING PROGRAM

## 2023-09-23 PROCEDURE — 71045 X-RAY EXAM CHEST 1 VIEW: CPT | Performed by: STUDENT IN AN ORGANIZED HEALTH CARE EDUCATION/TRAINING PROGRAM

## 2023-09-23 PROCEDURE — 85025 COMPLETE CBC W/AUTO DIFF WBC: CPT | Performed by: STUDENT IN AN ORGANIZED HEALTH CARE EDUCATION/TRAINING PROGRAM

## 2023-09-23 PROCEDURE — 80053 COMPREHEN METABOLIC PANEL: CPT | Performed by: STUDENT IN AN ORGANIZED HEALTH CARE EDUCATION/TRAINING PROGRAM

## 2023-09-23 PROCEDURE — 96361 HYDRATE IV INFUSION ADD-ON: CPT

## 2023-09-23 PROCEDURE — 96375 TX/PRO/DX INJ NEW DRUG ADDON: CPT

## 2023-09-23 PROCEDURE — 96374 THER/PROPH/DIAG INJ IV PUSH: CPT

## 2023-09-23 PROCEDURE — 71046 X-RAY EXAM CHEST 2 VIEWS: CPT | Performed by: STUDENT IN AN ORGANIZED HEALTH CARE EDUCATION/TRAINING PROGRAM

## 2023-09-23 PROCEDURE — 0241U SARS-COV-2/FLU A AND B/RSV BY PCR (GENEXPERT): CPT | Performed by: STUDENT IN AN ORGANIZED HEALTH CARE EDUCATION/TRAINING PROGRAM

## 2023-09-23 PROCEDURE — 99284 EMERGENCY DEPT VISIT MOD MDM: CPT

## 2023-09-23 PROCEDURE — 86308 HETEROPHILE ANTIBODY SCREEN: CPT | Performed by: STUDENT IN AN ORGANIZED HEALTH CARE EDUCATION/TRAINING PROGRAM

## 2023-09-23 PROCEDURE — 86664 EPSTEIN-BARR NUCLEAR ANTIGEN: CPT | Performed by: STUDENT IN AN ORGANIZED HEALTH CARE EDUCATION/TRAINING PROGRAM

## 2023-09-23 PROCEDURE — 87880 STREP A ASSAY W/OPTIC: CPT

## 2023-09-23 RX ORDER — KETOROLAC TROMETHAMINE 15 MG/ML
15 INJECTION, SOLUTION INTRAMUSCULAR; INTRAVENOUS ONCE
Status: COMPLETED | OUTPATIENT
Start: 2023-09-23 | End: 2023-09-23

## 2023-09-23 RX ORDER — CEFDINIR 300 MG/1
300 CAPSULE ORAL 2 TIMES DAILY
Qty: 20 CAPSULE | Refills: 0 | Status: SHIPPED | OUTPATIENT
Start: 2023-09-23 | End: 2023-10-03

## 2023-09-23 RX ORDER — SUMATRIPTAN 50 MG/1
50 TABLET, FILM COATED ORAL EVERY 2 HOUR PRN
COMMUNITY

## 2023-09-23 RX ORDER — DEXAMETHASONE SODIUM PHOSPHATE 4 MG/ML
10 VIAL (ML) INJECTION ONCE
Status: COMPLETED | OUTPATIENT
Start: 2023-09-23 | End: 2023-09-23

## 2023-09-23 RX ORDER — IBUPROFEN 800 MG/1
800 TABLET ORAL EVERY 6 HOURS PRN
COMMUNITY

## 2023-09-23 RX ORDER — AMOXICILLIN AND CLAVULANATE POTASSIUM 875; 125 MG/1; MG/1
1 TABLET, FILM COATED ORAL 2 TIMES DAILY
COMMUNITY

## 2023-09-23 NOTE — ED INITIAL ASSESSMENT (HPI)
S: pt presents to ed with c/o continued neck and throat pain after 3-4 days of antibiotics. Pt states that she had a fever when she saw her doctor on Tuesday and then had the chills the next night, but the pain has continued without improvement.

## 2023-09-24 NOTE — DISCHARGE INSTRUCTIONS
Please stop taking the Augmentin and instead switch to cefdinir as this may better cover your strep throat. Return to the emergency department if you develop vomiting or inability to tolerate liquids or your antibiotics. Return if you develop difficulty speaking, worsening pain, or continued symptoms after completing course of antibiotics. Please follow-up with primary care provider to ensure resolution of symptoms.

## 2023-09-25 LAB
EBV NA IGG SER QL IA: POSITIVE
EBV VCA IGG SER QL IA: POSITIVE
EBV VCA IGM SER QL IA: NEGATIVE

## 2023-12-07 LAB
CYTOLOGY CVX/VAG DOC THIN PREP: NORMAL
HPV16+18+45 E6+E7MRNA CVX NAA+PROBE: NEGATIVE

## 2024-07-03 PROBLEM — E03.9 HYPOTHYROIDISM: Status: ACTIVE | Noted: 2024-07-03

## 2024-07-03 PROBLEM — R07.9 CHEST PAIN: Status: ACTIVE | Noted: 2024-07-03

## 2024-07-16 ENCOUNTER — APPOINTMENT (OUTPATIENT)
Dept: CARDIOLOGY | Age: 29
End: 2024-07-16

## 2024-07-16 ENCOUNTER — OFFICE VISIT (OUTPATIENT)
Dept: CARDIOLOGY | Age: 29
End: 2024-07-16

## 2024-07-16 VITALS
SYSTOLIC BLOOD PRESSURE: 121 MMHG | BODY MASS INDEX: 26.26 KG/M2 | WEIGHT: 167.33 LBS | HEART RATE: 86 BPM | DIASTOLIC BLOOD PRESSURE: 83 MMHG | HEIGHT: 67 IN

## 2024-07-16 VITALS
HEART RATE: 86 BPM | HEIGHT: 67 IN | DIASTOLIC BLOOD PRESSURE: 83 MMHG | BODY MASS INDEX: 26.26 KG/M2 | SYSTOLIC BLOOD PRESSURE: 121 MMHG | WEIGHT: 167.33 LBS | OXYGEN SATURATION: 98 %

## 2024-07-16 DIAGNOSIS — R94.31 ABNORMAL ELECTROCARDIOGRAM (ECG) (EKG): ICD-10-CM

## 2024-07-16 DIAGNOSIS — R06.02 SOB (SHORTNESS OF BREATH): ICD-10-CM

## 2024-07-16 DIAGNOSIS — I25.9 CHEST PAIN DUE TO MYOCARDIAL ISCHEMIA, UNSPECIFIED ISCHEMIC CHEST PAIN TYPE: ICD-10-CM

## 2024-07-16 DIAGNOSIS — E78.5 HYPERLIPIDEMIA, UNSPECIFIED HYPERLIPIDEMIA TYPE: Primary | ICD-10-CM

## 2024-07-16 DIAGNOSIS — E03.9 HYPOTHYROIDISM, UNSPECIFIED TYPE: ICD-10-CM

## 2024-07-16 DIAGNOSIS — R00.2 PALPITATIONS: ICD-10-CM

## 2024-07-16 PROCEDURE — 99205 OFFICE O/P NEW HI 60 MIN: CPT | Performed by: INTERNAL MEDICINE

## 2024-07-16 RX ORDER — FERROUS SULFATE 325(65) MG
325 TABLET, DELAYED RELEASE (ENTERIC COATED) ORAL
COMMUNITY
End: 2024-07-16

## 2024-07-16 SDOH — HEALTH STABILITY: PHYSICAL HEALTH: ON AVERAGE, HOW MANY MINUTES DO YOU ENGAGE IN EXERCISE AT THIS LEVEL?: 20 MIN

## 2024-07-16 SDOH — HEALTH STABILITY: PHYSICAL HEALTH: ON AVERAGE, HOW MANY DAYS PER WEEK DO YOU ENGAGE IN MODERATE TO STRENUOUS EXERCISE (LIKE A BRISK WALK)?: 6 DAYS

## 2024-07-16 ASSESSMENT — PATIENT HEALTH QUESTIONNAIRE - PHQ9
1. LITTLE INTEREST OR PLEASURE IN DOING THINGS: NOT AT ALL
SUM OF ALL RESPONSES TO PHQ9 QUESTIONS 1 AND 2: 0
SUM OF ALL RESPONSES TO PHQ9 QUESTIONS 1 AND 2: 0
CLINICAL INTERPRETATION OF PHQ2 SCORE: NO FURTHER SCREENING NEEDED
2. FEELING DOWN, DEPRESSED OR HOPELESS: NOT AT ALL

## 2024-07-20 ENCOUNTER — E-ADVICE (OUTPATIENT)
Dept: CARDIOLOGY | Age: 29
End: 2024-07-20

## 2024-08-02 ENCOUNTER — APPOINTMENT (OUTPATIENT)
Dept: CARDIOLOGY | Age: 29
End: 2024-08-02
Attending: INTERNAL MEDICINE

## 2024-08-05 ENCOUNTER — APPOINTMENT (OUTPATIENT)
Dept: CARDIOLOGY | Age: 29
End: 2024-08-05
Attending: INTERNAL MEDICINE

## 2024-08-05 DIAGNOSIS — R06.02 SOB (SHORTNESS OF BREATH): ICD-10-CM

## 2024-08-05 LAB
AORTIC VALVE AREA (AVA): 0.97
ASCENDING AORTA (AAD): 3
AV PEAK GRADIENT (AVPG): 7
AV PEAK VELOCITY (AVPV): 1.33
AV STENOSIS SEVERITY TEXT: NORMAL
AVI LVOT PEAK GRADIENT (LVOTMG): 0.9
E WAVE DECELARATION TIME (MDT): 7.33
INTERVENTRICULAR SEPTUM IN END DIASTOLE (IVSD): 2.35
LEFT INTERNAL DIMENSION IN SYSTOLE (LVSD): 0.8
LEFT VENTRICULAR INTERNAL DIMENSION IN DIASTOLE (LVDD): 2.6
LEFT VENTRICULAR POSTERIOR WALL IN END DIASTOLE (LVPW): 3.8
LV EF: NORMAL %
LVOT 2D (LVOTD): 22.2
LVOT VTI (LVOTVTI): 1.07
MV E TISSUE VEL MED (MESV): 15.1
MV E WAVE VEL/E TISSUE VEL MED(MSR): 13.2
MV PEAK A VELOCITY (MVPAV): 162
MV PEAK E VELOCITY (MVPEV): 0.67
RV END SYSTOLIC LONGITUDINAL STRAIN FREE WALL (RVGS): 2.1
TRICUSPID VALVE ANNULAR PEAK VELOCITY (TVAPV): 19
TRICUSPID VALVE PEAK REGURGITATION VELOCITY (TRPV): 3.4
TV ESTIMATED RIGHT ARTERIAL PRESSURE (RAP): 14.3

## 2024-08-05 PROCEDURE — 93306 TTE W/DOPPLER COMPLETE: CPT | Performed by: INTERNAL MEDICINE

## 2024-08-07 ENCOUNTER — APPOINTMENT (OUTPATIENT)
Dept: CARDIOLOGY | Age: 29
End: 2024-08-07

## 2024-08-07 VITALS
WEIGHT: 167 LBS | DIASTOLIC BLOOD PRESSURE: 77 MMHG | HEART RATE: 73 BPM | HEIGHT: 67 IN | SYSTOLIC BLOOD PRESSURE: 115 MMHG | OXYGEN SATURATION: 100 % | BODY MASS INDEX: 26.21 KG/M2

## 2024-08-07 DIAGNOSIS — I25.9 CHEST PAIN DUE TO MYOCARDIAL ISCHEMIA, UNSPECIFIED ISCHEMIC CHEST PAIN TYPE: ICD-10-CM

## 2024-08-07 DIAGNOSIS — R00.2 PALPITATIONS: Primary | ICD-10-CM

## 2024-08-07 DIAGNOSIS — R06.02 SOB (SHORTNESS OF BREATH): ICD-10-CM

## 2024-08-07 PROCEDURE — 99213 OFFICE O/P EST LOW 20 MIN: CPT | Performed by: INTERNAL MEDICINE

## 2024-08-08 ENCOUNTER — APPOINTMENT (OUTPATIENT)
Dept: CARDIOLOGY | Age: 29
End: 2024-08-08
Attending: INTERNAL MEDICINE

## 2024-08-27 PROCEDURE — 93248 EXT ECG>7D<15D REV&INTERPJ: CPT | Performed by: INTERNAL MEDICINE

## 2024-09-04 PROBLEM — F41.1 GENERALIZED ANXIETY DISORDER: Status: ACTIVE | Noted: 2024-09-04

## 2024-10-10 ENCOUNTER — CLINICAL ABSTRACT (OUTPATIENT)
Dept: CARE COORDINATION | Age: 29
End: 2024-10-10

## 2024-10-28 ENCOUNTER — OFFICE VISIT (OUTPATIENT)
Dept: ENDOCRINOLOGY CLINIC | Facility: CLINIC | Age: 29
End: 2024-10-28
Payer: COMMERCIAL

## 2024-10-28 VITALS
HEART RATE: 58 BPM | WEIGHT: 169 LBS | SYSTOLIC BLOOD PRESSURE: 113 MMHG | BODY MASS INDEX: 26.53 KG/M2 | HEIGHT: 67 IN | DIASTOLIC BLOOD PRESSURE: 68 MMHG

## 2024-10-28 DIAGNOSIS — E04.9 GOITER: Primary | ICD-10-CM

## 2024-10-28 DIAGNOSIS — R94.6 ABNORMAL THYROID FUNCTION TEST: ICD-10-CM

## 2024-10-28 DIAGNOSIS — R53.83 FATIGUE, UNSPECIFIED TYPE: ICD-10-CM

## 2024-10-28 PROCEDURE — 99204 OFFICE O/P NEW MOD 45 MIN: CPT | Performed by: INTERNAL MEDICINE

## 2024-10-28 NOTE — H&P
Name: Michael Mckinley  Date: 10/28/2024    Referring Physician: No ref. provider found    Chief Complaint   Patient presents with    Consult     Underactive thyroid , pt c/o tremors off and on for years        HISTORY OF PRESENT ILLNESS   Michael Mckinley is a 29 year old female who presents for   Chief Complaint   Patient presents with    Consult     Underactive thyroid , pt c/o tremors off and on for years      This is a 29 year-old woman here for establishment of care for hypothyroidism, that was diagnosed about 1 year ago. She had an annual exam and TSH was elevated. She was started on 25mcg of levothyroxine. 6-7 months after this, her TSH was checked again, and the level was <1. She is still on the same dose.     She takes the levothyroxine 30 minutes before dinner. This is about 4 hours after her last snack.    Before taking the medication, she had always been feeling sleepy, and was always tired.     Since July, she has been feeling more anxious, has a tremor, shaky. She wakes up a lot at night.     Supplements- none      Medical History:   Past Medical History:    Anemia       Surgical History:   Past Surgical History:   Procedure Laterality Date    Removal gallbladder         Family History:  Family History   Problem Relation Age of Onset    Hypertension Father     Diabetes Mother        Social History:   Social History     Socioeconomic History    Marital status:    Tobacco Use    Smoking status: Never    Smokeless tobacco: Never   Vaping Use    Vaping status: Never Used   Substance and Sexual Activity    Alcohol use: Never    Drug use: Never     Social Drivers of Health     Physical Activity: Medium Risk (7/16/2024)    Received from [facility]    Exercise Vital Sign     On average, how many days per week do you engage in moderate to strenuous exercise (like a brisk walk)?: 6 days     On average, how many minutes do you engage in exercise at this level?: 20 min    [facility] from [facility]    Housing Stability       Medications:     Current Outpatient Medications:     SUMAtriptan 50 MG Oral Tab, Take 1 tablet (50 mg total) by mouth every 2 (two) hours as needed for Migraine., Disp: , Rfl:     ibuprofen 800 MG Oral Tab, Take 1 tablet (800 mg total) by mouth every 6 (six) hours as needed for Pain., Disp: , Rfl:     amoxicillin clavulanate 875-125 MG Oral Tab, Take 1 tablet by mouth 2 (two) times daily., Disp: , Rfl:     Prenatal Vit-Fe Fumarate-FA ( PRENATAL VITAMINS) 28-0.8 MG Oral Tab, Take by mouth. (Patient not taking: Reported on 11/2/2022), Disp: , Rfl:     ferrous sulfate 325 (65 FE) MG Oral Tab EC, Take 325 mg by mouth daily with breakfast., Disp: , Rfl:      Allergies:   Allergies[1]    REVIEW OF SYSTEMS  Eyes: no change in vision  Neurologic: no headache, generalized or focal weakness or numbness.  Head: normal  ENT: normal  Lungs: no shortness of breath, wheezing or BAHENA  Cardiovascular:  no chest pain or palpitations  Gastrointestinal:  no abdominal pain, bowel movement problems  Musculoskeletal: no muscle pain or arthralgia  /Gyne: no frequency or discomfort while urinating  Psychiatric:  no acute distress, anxiety  or depression  Skin: normal moisturized skin    PHYSICAL EXAMINATION:  /68   Pulse 58   Ht 5' 7\" (1.702 m)   Wt 169 lb (76.7 kg)   BMI 26.47 kg/m²     General Appearance:  Alert, in no acute distress, well developed  Eyes: normal conjunctivae, sclera.  Ears/Nose/Mouth/Throat/Neck:  normal hearing, normal speech and enlarged thyroid gland  Psychiatric:  oriented to time, self, and place  Nutritional:  no abnormal weight gain or loss    Labs:  Date  TSH  FT4  LT4  12/13/23 5.84  0.9   07/02/24 0.63  0.9  25      Imaging:  Reviewed    ASSESSMENT/PLAN: -This is a 29 year-old female patient who was started on levothyroxine, perhaps prematurely about 10 months ago, perhaps prematurely. She is now having some signs and symptoms that could be  considered to be consistent with over-treatment. I would like to recheck thyroid function tests before proceeding further. She also has an enlarged thyroid, and I would like to check an US Thyroid.     She still states that she feels tired still. I would like to check vitamin D as well as vitamin B12. I have also recommended to her to consume more fruits and vegetables. I will follow up with her after I see test results.     Return to clinic in 3 months    Prior to this encounter, I spent over 20 minutes with preparing for the visit, reviewing documents from other specialties as well as from PCP, and going over test results and imaging studies. During the face to face encounter, I spent an additional 30 minutes which were determined for history-taking, physical examination, and orientation regarding our services. Greater than 50% of the time was spent in counseling, anticipatory guidance, and coordination of care. Patient concerns were answered to the best of my knowledge.       10/28/2024  Lorelei Dailey MD         [1] No Known Allergies

## 2024-11-11 LAB
T3FREE SERPL-MCNC: 3.55 PG/ML (ref 2.4–4.2)
T4 FREE SERPL-MCNC: 1.1 NG/DL (ref 0.8–1.7)
TSI SER-ACNC: 1.53 UIU/ML (ref 0.55–4.78)
VIT B12 SERPL-MCNC: 410 PG/ML (ref 211–911)

## 2025-01-27 NOTE — PROGRESS NOTES
Name: Michael Trivedi  Date: 1/27/25    Referring Physician: No ref. provider found    Chief Complaint   Patient presents with    Goiter     Follow up        HISTORY OF PRESENT ILLNESS   Michael Trivedi is a 29 year old female who presents for   Chief Complaint   Patient presents with    Goiter     Follow up      This is a 29 year-old woman here for establishment of care for hypothyroidism, that was diagnosed about 1 year ago. She had an annual exam and TSH was elevated. She was started on 25mcg of levothyroxine. 6-7 months after this, her TSH was checked again, and the level was <1.     Since July, she has been feeling more anxious, has a tremor, shaky. She wakes up a lot at night.     Supplements- none    At the last visit, I had asked her to stop the levothyroxine and that we would check other parameters of fatigue.     She is here to discuss the thyroid US as well.       Medical History:   Past Medical History:    Anemia       Surgical History:   Past Surgical History:   Procedure Laterality Date    Removal gallbladder         Family History:  Family History   Problem Relation Age of Onset    Hypertension Father     Diabetes Mother        Social History:   Social History     Socioeconomic History    Marital status:    Tobacco Use    Smoking status: Never    Smokeless tobacco: Never   Vaping Use    Vaping status: Never Used   Substance and Sexual Activity    Alcohol use: Never    Drug use: Never     Social Drivers of Health     Physical Activity: Medium Risk (7/16/2024)    Received from [facility]    Exercise Vital Sign     On average, how many days per week do you engage in moderate to strenuous exercise (like a brisk walk)?: 6 days     On average, how many minutes do you engage in exercise at this level?: 20 min    Received from [facility]    Housing Stability       Medications:     Current Outpatient Medications:     SUMAtriptan 50 MG Oral Tab, Take 1 tablet (50  mg total) by mouth every 2 (two) hours as needed for Migraine., Disp: , Rfl:     ibuprofen 800 MG Oral Tab, Take 1 tablet (800 mg total) by mouth every 6 (six) hours as needed for Pain., Disp: , Rfl:     amoxicillin clavulanate 875-125 MG Oral Tab, Take 1 tablet by mouth 2 (two) times daily., Disp: , Rfl:     Prenatal Vit-Fe Fumarate-FA (SM PRENATAL VITAMINS) 28-0.8 MG Oral Tab, Take by mouth. (Patient not taking: Reported on 11/2/2022), Disp: , Rfl:     ferrous sulfate 325 (65 FE) MG Oral Tab EC, Take 325 mg by mouth daily with breakfast., Disp: , Rfl:      Allergies:   Allergies[1]    REVIEW OF SYSTEMS  Eyes: no change in vision  Neurologic: no headache, generalized or focal weakness or numbness.  Head: normal  ENT: normal  Lungs: no shortness of breath, wheezing or BAHENA  Cardiovascular:  no chest pain or palpitations  Gastrointestinal:  no abdominal pain, bowel movement problems  Musculoskeletal: no muscle pain or arthralgia  /Gyne: no frequency or discomfort while urinating  Psychiatric:  no acute distress, anxiety  or depression  Skin: normal moisturized skin    PHYSICAL EXAMINATION:  /75   Pulse 66   Ht 5' 7\" (1.702 m)   Wt 168 lb (76.2 kg)   BMI 26.31 kg/m²     General Appearance:  Alert, in no acute distress, well developed  Eyes: normal conjunctivae, sclera.  Ears/Nose/Mouth/Throat/Neck:  normal hearing, normal speech and enlarged thyroid gland  Psychiatric:  oriented to time, self, and place  Nutritional:  no abnormal weight gain or loss    Labs:  Date  TSH  FT4  LT4  12/13/23 5.84  0.9   07/02/24 0.63  0.9  25      Imaging:  Reviewed    ASSESSMENT/PLAN: -This is a 29 year-old female patient who was started on levothyroxine, perhaps prematurely about 10 months ago, perhaps prematurely. She was then having signs and symptoms that could be considered to be consistent with over-treatment.     I had recommended to her that she cut down on processed foods as well as eat more fruit vegetables. She  states that she feels well after eating like this.     I have noted that vitamin D is very low and so I would like to start her on high-dose vitamin D and check vitamin D, vitamin B12, and TSH and FT4 in 3 months    Return to clinic in 3 months    Prior to this encounter, I spent over 15 minutes with preparing for the visit, including reviewing documents from other specialties as well as from PCP and going over test results and imaging studies. During the face to face encounter, I spent an additional 15 minutes which were determined for follow-up. Greater than 50% of the time was spent in counseling, anticipatory guidance, and coordination of care. Patient concerns were answered to the best of my knowledge.         1/27/25  Lorelei Dailey MD         [1] No Known Allergies